# Patient Record
Sex: FEMALE | Race: WHITE | Employment: FULL TIME | URBAN - METROPOLITAN AREA
[De-identification: names, ages, dates, MRNs, and addresses within clinical notes are randomized per-mention and may not be internally consistent; named-entity substitution may affect disease eponyms.]

---

## 2018-04-27 ENCOUNTER — OFFICE VISIT (OUTPATIENT)
Dept: INTERNAL MEDICINE CLINIC | Age: 28
End: 2018-04-27

## 2018-04-27 VITALS
DIASTOLIC BLOOD PRESSURE: 78 MMHG | SYSTOLIC BLOOD PRESSURE: 118 MMHG | TEMPERATURE: 98.1 F | RESPIRATION RATE: 12 BRPM | HEART RATE: 85 BPM | HEIGHT: 63 IN | OXYGEN SATURATION: 99 % | WEIGHT: 175 LBS | BODY MASS INDEX: 31.01 KG/M2

## 2018-04-27 DIAGNOSIS — Z00.00 ROUTINE GENERAL MEDICAL EXAMINATION AT A HEALTH CARE FACILITY: Primary | ICD-10-CM

## 2018-04-27 RX ORDER — ACETAMINOPHEN AND CODEINE PHOSPHATE 120; 12 MG/5ML; MG/5ML
SOLUTION ORAL
Refills: 3 | COMMUNITY
Start: 2018-02-13

## 2018-04-27 NOTE — PROGRESS NOTES
Chief Complaint   Patient presents with    Complete Physical     Health Maintenance Due   Topic    PAP AKA CERVICAL CYTOLOGY  - record requested from Dr. Zuleyka Michaud. *Patient had to leave prior to MD seeing her due to family emergency. Pt rescheduled for May 5th.

## 2018-05-04 ENCOUNTER — OFFICE VISIT (OUTPATIENT)
Dept: INTERNAL MEDICINE CLINIC | Age: 28
End: 2018-05-04

## 2018-05-04 VITALS
SYSTOLIC BLOOD PRESSURE: 122 MMHG | BODY MASS INDEX: 31.33 KG/M2 | TEMPERATURE: 98.4 F | RESPIRATION RATE: 10 BRPM | DIASTOLIC BLOOD PRESSURE: 80 MMHG | HEART RATE: 86 BPM | HEIGHT: 63 IN | OXYGEN SATURATION: 98 % | WEIGHT: 176.8 LBS

## 2018-05-04 DIAGNOSIS — Z88.9 HX OF SEASONAL ALLERGIES: ICD-10-CM

## 2018-05-04 DIAGNOSIS — L85.8 KERATOSIS PILARIS, ACQUIRED: ICD-10-CM

## 2018-05-04 DIAGNOSIS — Z00.00 ROUTINE GENERAL MEDICAL EXAMINATION AT A HEALTH CARE FACILITY: Primary | ICD-10-CM

## 2018-05-04 RX ORDER — FEXOFENADINE HCL AND PSEUDOEPHEDRINE HCI 180; 240 MG/1; MG/1
1 TABLET, EXTENDED RELEASE ORAL DAILY
COMMUNITY

## 2018-05-04 RX ORDER — TRIAMCINOLONE ACETONIDE 1 MG/G
CREAM TOPICAL
Qty: 80 G | Refills: 1 | Status: SHIPPED | OUTPATIENT
Start: 2018-05-04

## 2018-05-04 RX ORDER — FLUTICASONE PROPIONATE 50 MCG
2 SPRAY, SUSPENSION (ML) NASAL DAILY
Qty: 1 BOTTLE | Refills: 11 | Status: SHIPPED | OUTPATIENT
Start: 2018-05-04 | End: 2018-05-08 | Stop reason: ALTCHOICE

## 2018-05-04 NOTE — MR AVS SNAPSHOT
[address] [address] 
[phone] Patient: Mackenzie Gonzalez MRN: [id number] OLE:[id number] Visit Information Date & Time Provider Department Dept. Phone Encounter #  
 5/4/2018  8:15 AM Viktoriya Rodriguez MD [address] [address] Internal Medicine [phone] [id number] Follow-up Instructions Return in about 1 year (around 5/4/2019) for CPE. Upcoming Health Maintenance Date Due  
 PAP AKA CERVICAL CYTOLOGY 7/29/2017 Influenza Age 5 to Adult 8/1/2018 DTaP/Tdap/Td series (2 - Td) 5/15/2027 Allergies as of 5/4/2018  Review Complete On: 5/4/2018 By: Viktoriya Rodriguez MD  
 No Known Allergies Current Immunizations  Reviewed on 8/29/2014 Name Date Tdap 5/15/2017 Not reviewed this visit You Were Diagnosed With   
  
 Codes Comments Routine general medical examination at a health care facility    -  Primary ICD-10-CM: Z00.00 ICD-9-CM: V70.0 Hx of seasonal allergies     ICD-10-CM: Z88.9 ICD-9-CM: V15.09 Keratosis pilaris, acquired     ICD-10-CM: L85.8 ICD-9-CM: 701.1 Vitals BP Pulse Temp Resp Height(growth percentile) Weight(growth percentile) 122/80 86 98.4 °F (36.9 °C) (Oral) 10 5' 2.5\" (1.588 m) 176 lb 12.8 oz (80.2 kg) LMP SpO2 BMI OB Status Smoking Status 04/20/2018 98% 31.82 kg/m2 Having regular periods Never Smoker Vitals History BMI and BSA Data Body Mass Index Body Surface Area  
 31.82 kg/m 2 1.88 m 2 Preferred Pharmacy Pharmacy Name Phone [id number] [place], [address] [facility] [phone] Your Updated Medication List  
  
   
This list is accurate as of 5/4/18  8:59 AM.  Always use your most recent med list.  
  
  
  
  
 albuterol 90 mcg/actuation inhaler Commonly known as:  PROAIR HFA Take 2 puffs by inhalation every four (4) hours as needed for Wheezing or Shortness of Breath. ALLEGRA-D 24 HOUR 180-240 mg per tablet Generic drug:  fexofenadine-pseudoephedrine Take 1 Tab by mouth daily. fluticasone 50 mcg/actuation nasal spray Commonly known as:  Wenda Maze 2 Sprays by Both Nostrils route daily. Indications: Allergic Rhinitis NETTLE LEAF PO Take  by mouth daily. norethindrone 0.35 mg Tab Commonly known as:  MICRONOR  
TAKE 1 TABLET BY MOUTH EVERY DAY  
  
 triamcinolone acetonide 0.1 % topical cream  
Commonly known as:  KENALOG Apply  to affected area daily as needed for Skin Irritation. use thin layer Prescriptions Sent to Pharmacy Refills  
 fluticasone (FLONASE) 50 mcg/actuation nasal spray 11 Si Sprays by Both Nostrils route daily. Indications: Allergic Rhinitis Class: Normal  
 Pharmacy: [facility]/pharmacy #[id number] Ph #: [phone] Route: Both Nostrils  
 triamcinolone acetonide (KENALOG) 0.1 % topical cream 1 Sig: Apply  to affected area daily as needed for Skin Irritation. use thin layer Class: Normal  
 Pharmacy: [facility]/pharmacy #[id number] Ph #: [phone] Route: Topical  
  
Follow-up Instructions Return in about 1 year (around 2019) for CPE. Introducing [facility]! Dear Salena Milner: Thank you for requesting a [employer] account. Our records indicate that you already have an active [employer] account. You can access your account anytime at [email or web address] Did you know that you can access your hospital and ER discharge instructions at any time in [employer]? You can also review all of your test results from your hospital stay or ER visit. Additional Information If you have questions, please visit the Frequently Asked Questions section of the [employer] website at [email or web address] Remember, [employer] is NOT to be used for urgent needs. For medical emergencies, dial 911. Now available from your iPhone and Android! Please provide this summary of care documentation to your next provider. Your primary care clinician is listed as Linette [phone] If you have any questions after today's visit, please call [phone].

## 2018-05-04 NOTE — PROGRESS NOTES
Chief Complaint   Patient presents with    Complete Physical     Health Maintenance Due   Topic    PAP AKA CERVICAL CYTOLOGY      · Record for pap was requested x 1 week ago awaiting records.

## 2018-05-04 NOTE — PROGRESS NOTES
Subjective:   25 y.o. female for Well Woman Check. Her gyne and breast care is done elsewhere by her Ob-Gyne physician. Patient Active Problem List    Diagnosis Date Noted    Galactorrhea of left breast 09/02/2015    Elevated prolactin level ([facility] [address] 09/02/2015    Hx of seasonal allergies 03/19/2013     Current Outpatient Prescriptions   Medication Sig Dispense Refill    fexofenadine-pseudoephedrine (ALLEGRA-D 24 HOUR) 180-240 mg per tablet Take 1 Tab by mouth daily.  fluticasone (FLONASE) 50 mcg/actuation nasal spray 2 Sprays by Both Nostrils route daily. Indications: Allergic Rhinitis 1 Bottle 11    triamcinolone acetonide (KENALOG) 0.1 % topical cream Apply  to affected area daily as needed for Skin Irritation. use thin layer 80 g 1    norethindrone (MICRONOR) 0.35 mg tab TAKE 1 TABLET BY MOUTH EVERY DAY  3    albuterol (PROAIR HFA) 90 mcg/actuation inhaler Take 2 puffs by inhalation every four (4) hours as needed for Wheezing or Shortness of Breath. 1 Inhaler 0    NETTLE LEAF PO Take  by mouth daily. No Known Allergies  Past Medical History:   Diagnosis Date    Hx of seasonal allergies      Past Surgical History:   Procedure Laterality Date    HX GYN  6/24/17    Had vaginal birth     Family History   Problem Relation Age of Onset    Cancer Maternal Grandmother      pancreatic    Hypertension Father     Elevated Lipids Father     Thyroid Disease Sister      Social History   Substance Use Topics    Smoking status: Never Smoker    Smokeless tobacco: Never Used    Alcohol use 0.6 - 1.2 oz/week     2 Glasses of wine per week             Specific concerns today: Some ongoing issues with dry scaly skin on the back of both arms. She is also had ongoing issues with allergies. She has been taking her Allegra twice daily and it has been helping. She does have some ongoing nasal congestion and itchy eyes. No cough or wheeze. No asthma issues. Her periods have been somewhat irregular. She did see the gynecologist for Pap smear in the last 6 months. She is having a difficult time with losing weight after having her baby 11 months ago. .    Review of Systems  A comprehensive review of systems was negative except for that written in the HPI. Objective:   Blood pressure 122/80, pulse 86, temperature 98.4 °F (36.9 °C), temperature source Oral, resp. rate 10, height 5' 2.5\" (1.588 m), weight 176 lb 12.8 oz (80.2 kg), last menstrual period 04/20/2018, SpO2 98 %. Physical Examination:   General appearance - alert, well appearing, and in no distress  Eyes - pupils equal and reactive, extraocular eye movements intact  Ears - bilateral TM's and external ear canals normal  Nose - normal and patent, no erythema, discharge or polyps  Mouth - mucous membranes moist, pharynx normal without lesions  Neck - supple, no significant adenopathy  Lymphatics - no palpable lymphadenopathy, no hepatosplenomegaly  Chest - clear to auscultation, no wheezes, rales or rhonchi, symmetric air entry  Heart - normal rate, regular rhythm, normal S1, S2, no murmurs, rubs, clicks or gallops  Abdomen - soft, nontender, nondistended, no masses or organomegaly  Back exam - full range of motion, no tenderness, palpable spasm or pain on motion  Neurological - alert, oriented, normal speech, no focal findings or movement disorder noted  Musculoskeletal - no joint tenderness, deformity or swelling  Extremities - peripheral pulses normal, no pedal edema, no clubbing or cyanosis  Skin -dry scaly skin in a perifollicular pattern on the backs of her upper arms. No redness or excoriation. Assessment/Plan:     lose weight, increase physical activity, routine labs ordered  Diagnoses and all orders for this visit:    1. Routine general medical examination at a health care facility    2. Hx of seasonal allergies -we will add nasal steroids and have her continue using Allegra for now.     3. Keratosis pilaris, acquired -we will treat with topical steroids and moisturizers. She will see dermatology as needed. Other orders  -     fluticasone (FLONASE) 50 mcg/actuation nasal spray; 2 Sprays by Both Nostrils route daily. Indications: Allergic Rhinitis  -     triamcinolone acetonide (KENALOG) 0.1 % topical cream; Apply  to affected area daily as needed for Skin Irritation. use thin layer       Follow-up Disposition:  Return in about 1 year (around 5/4/2019) for CPE. Advised her to call back or return to office if symptoms worsen/change/persist.  Discussed expected course/resolution/complications of diagnosis in detail with patient. Medication risks/benefits/costs/interactions/alternatives discussed with patient. She was given an after visit summary which includes diagnoses, current medications, & vitals. She expressed understanding with the diagnosis and plan.

## 2018-05-08 ENCOUNTER — TELEPHONE (OUTPATIENT)
Dept: INTERNAL MEDICINE CLINIC | Age: 28
End: 2018-05-08

## 2018-05-08 RX ORDER — MOMETASONE FUROATE 50 UG/1
2 SPRAY, METERED NASAL DAILY
Qty: 1 CONTAINER | Refills: 11 | Status: SHIPPED | OUTPATIENT
Start: 2018-05-08

## 2018-05-08 NOTE — TELEPHONE ENCOUNTER
Spoke with pt in ref to rx for Flonase and that her insurance company denied. Per [facility], an alt of Nasonex has been sent to [facility].     Orders Placed This Encounter    mometasone (NASONEX) 50 mcg/actuation nasal spray     Si Sprays by Both Nostrils route daily. Dispense:  1 Container     Refill:  11     The above medication refills were approved via verbal order by Dr. Aurora Masterson III.

## 2018-05-08 NOTE — TELEPHONE ENCOUNTER
----- Message from Manuel Pastor MD sent at 5/8/2018 10:46 AM EDT -----  Regarding: FW: Fluticasone  Mometasone same dose. 1 year.   ----- Message -----     From: Felipa Dumont     Sent: 5/8/2018  10:38 AM       To: Manuel Pastor MD  Subject: Fluticasone                                      Insurance will not cover Fluticasone, alternatives are Budesonide and Mometasone.

## 2023-05-18 RX ORDER — ACETAMINOPHEN AND CODEINE PHOSPHATE 120; 12 MG/5ML; MG/5ML
1 SOLUTION ORAL DAILY
COMMUNITY
Start: 2018-02-13

## 2023-05-18 RX ORDER — TRIAMCINOLONE ACETONIDE 1 MG/G
CREAM TOPICAL DAILY PRN
COMMUNITY
Start: 2018-05-04

## 2023-05-18 RX ORDER — MOMETASONE FUROATE 50 UG/1
2 SPRAY, METERED NASAL DAILY
COMMUNITY
Start: 2018-05-08

## 2023-05-18 RX ORDER — FEXOFENADINE HCL AND PSEUDOEPHEDRINE HCI 180; 240 MG/1; MG/1
1 TABLET, EXTENDED RELEASE ORAL DAILY
COMMUNITY

## 2023-05-18 RX ORDER — ALBUTEROL SULFATE 90 UG/1
2 AEROSOL, METERED RESPIRATORY (INHALATION) EVERY 4 HOURS PRN
COMMUNITY
Start: 2015-01-23